# Patient Record
Sex: FEMALE | Race: WHITE
[De-identification: names, ages, dates, MRNs, and addresses within clinical notes are randomized per-mention and may not be internally consistent; named-entity substitution may affect disease eponyms.]

---

## 2018-11-01 ENCOUNTER — HOSPITAL ENCOUNTER (EMERGENCY)
Dept: HOSPITAL 25 - UCEAST | Age: 43
Discharge: HOME | End: 2018-11-01
Payer: COMMERCIAL

## 2018-11-01 VITALS — DIASTOLIC BLOOD PRESSURE: 66 MMHG | SYSTOLIC BLOOD PRESSURE: 121 MMHG

## 2018-11-01 DIAGNOSIS — M54.5: Primary | ICD-10-CM

## 2018-11-01 DIAGNOSIS — E03.9: ICD-10-CM

## 2018-11-01 PROCEDURE — G0463 HOSPITAL OUTPT CLINIC VISIT: HCPCS

## 2018-11-01 PROCEDURE — 99202 OFFICE O/P NEW SF 15 MIN: CPT

## 2018-11-01 NOTE — UC
Back Pain HPI





- HPI Summary


HPI Summary: 


43 year old female with onset of low back pain 4 days ago. Describes as sharp 

and spasmotic primarily to the left lower back. Non-radiating. Worsens with 

movement. She saw chiropractor today without improvement. She has also tried 

naproxen and cyclobenzaprine that was previously prescribed to her for a 

previous episode of low back pain without relief however both of these 

prescriptions were . Denies fever, chills, abdominal pain, flank pain, 

nausea, vomiting, dysuria, frequency, urgency, hematuria, numbness, tingling, 

weakness of extremities, or loss of bowel or bladder control.








- History of Current Complaint


Chief Complaint: UCBackPain


Stated Complaint: BACK PAIN


Time Seen by Provider: 18 19:02


Hx Obtained From: Patient


Hx Last Menstrual Period: last week


Pregnant?: No


Onset/Duration: Sudden Onset, Lasting Days - 4


Timing: Constant


Severity Currently: Moderate


Pain Intensity: 7


Character: Sharp, Spasmodic


Aggravating Factor(s): Movement, Bending


Alleviating Factor(s): Nothing


Associated Signs And Symptoms: Negative: Fever, Weakness, Numbness, Tingling, 

Abdominal Pain, Flank Pain, Bladder Incontinence, Bowel Incontinence





- Allergies/Home Medications


Allergies/Adverse Reactions: 


 Allergies











Allergy/AdvReac Type Severity Reaction Status Date / Time


 


No Known Allergies Allergy   Verified 18 09:45











Home Medications: 


 Home Medications





Atovaquone/Proguanil HCl [Malarone] 1 tab PO BID 18 [History Confirmed ]


Ibuprofen [Advil] 400 mg PO Q8HR 18 [History Confirmed 18]


L.acidoph,Paracasei, B.lactis [Probiotic] 1 each PO DAILY 18 [History 

Confirmed 18]


Thyroid,Pork [Sumpter Thyroid] 15 mg PO DAILY 18 [History Confirmed ]


ceFUROXime TAB(*) [Ceftin  MG(*)] 500 mg PO BID 18 [History 

Confirmed 18]











PMH/Surg Hx/FS Hx/Imm Hx


Previously Healthy: Yes


Endocrine History: Hypothyroidism





- Surgical History


Surgical History: Yes


Surgery Procedure, Year, and Place: VEIN STRIPPING , 





- Family History


Family History: Noncontributory





- Social History


Occupation: Employed Full-time


Lives: With Family


Alcohol Use: None


Substance Use Type: None


Smoking Status (MU): Never Smoked Tobacco





Review of Systems


Constitutional: Negative


Skin: Negative


Respiratory: Negative


Cardiovascular: Negative


Gastrointestinal: Negative


Genitourinary: Negative


Motor: Negative


Neurovascular: Negative


Musculoskeletal: Other: - See HPI


Is Patient Immunocompromised?: No


All Other Systems Reviewed And Are Negative: Yes





Physical Exam


Triage Information Reviewed: Yes


Appearance: Well-Appearing, Well-Nourished, Pain Distress - Mild discomfort


Vital Signs: 


 Initial Vital Signs











Temp  97.7 F   18 18:36


 


Pulse  73   18 18:36


 


Resp  18   18 18:36


 


BP  121/66   18 18:36


 


Pulse Ox  100   18 18:36











Neck: Positive: Supple, Nontender


Respiratory: Positive: Lungs clear, Normal breath sounds, No respiratory 

distress


Cardiovascular: Positive: RRR, No Murmur


Abdomen Description: Positive: Nontender, No Organomegaly, Soft.  Negative: CVA 

Tenderness (R), CVA Tenderness (L), Distended, Guarding


Bowel Sounds: Positive: Present


Musculoskeletal: Positive: Strength Intact, Other: - Soft tissue tenderness 

left lower lumbar back with spasm. No tenderness over the spinous process.


Neurological: Positive: Alert, Muscle Tone Normal, Other: - Sensation intact 

distally


Skin Exam: Normal





Back Pain Course/Dx





- Course


Course Of Treatment: 43 year old female with left lower back pain x 4 days. 

Afebrile. VSS. Exam unremarkable except for some soft tissue tenderness left 

lumbar back with spasm. Patient was given dose of hydrocodone-acetaminophen 5 mg

-325 mg 1 tab and cyclobenzaprine 10 mg 1 tab for pain relief here in the 

clinic. She was given prescription for naproxen and cyclobenzaprine for home 

use. Recommend conservative treatment with above medications and heat. She is 

to follow up with PCP in 7 days if no improvement. Warning symptoms were 

reviewed. Verbalizes understanding and agrees with POC.





- Differential Dx/Diagnosis


Differential Diagnosis/HQI/PQRI: Arthritis, Herniated Disc, Strain


Provider Diagnoses: Acute lower back pain





Discharge





- Sign-Out/Discharge


Documenting (check all that apply): Patient Departure


All imaging exams completed and their final reports reviewed: No Studies





- Discharge Plan


Condition: Stable


Disposition: HOME


Prescriptions: 


Cyclobenzaprine (NF) [Cyclobenzaprine 5 MG (NF)] 5 mg PO TID PRN #30 tab


 PRN Reason: Spasms - Back


Naproxen [Naproxen 500 mg tab] 500 mg PO Q12HR #30 tablet


Patient Education Materials:  Low Back Strain (ED)


Referrals: 


Justyna Dawkins NP [Primary Care Provider] - 7 Days (If no improvement in 

symptoms)


Additional Instructions: 


Take naproxen 1 tab every 12 hours with food for next 5 days. After 5 days may 

take 1 tab every 12 hours as needed for pain.





Use cyclobenzaprine 1 tab every 8 hours as needed for severe pain and spasm. 

This will cause drowsiness so do not take and drive or operate machinery.





Apply warm moist heat to affected area for 15-20 minutes 4 times a day.





Follow up with your chiropractor as needed.





Follow up with your primary care provider in 7 days if symptoms persist.





Seek immediate medical attention in the emergency room if you have fever 

greater than 100.5 F, develop severe abdominal pain, persistent vomiting, 

numbness, tingling, weakness in your lower extremities, lose control of your 

bowel or bladder, or have any worsening of symptoms.





- Billing Disposition and Condition


Condition: STABLE


Disposition: Home





- Attestation Statements


Provider Attestation: 





Per institutional requirements, I have reviewed the chart, however, I was not 

consulted specifically or made aware of this patient by the  midlevel provider.

  I did not personally evaluate, interact with , or disposition  this patient.